# Patient Record
(demographics unavailable — no encounter records)

---

## 2025-01-02 NOTE — DISCUSSION/SUMMARY
[de-identified] : A discussion regarding available pain management treatment options occurred with the patient. These included interventional, rehabilitative, pharmacological, and alternative modalities. We will proceed with the following:   Imagin. MRI of the left knee ordered to rule out any internal derangement.  Interventional treatment options: - Potential treatment options such as further conservative therapy, injections, and surgery were briefly discussed.   Rehabilitative options: -Participation in active HEP was discussed and printed. KNEES: Patient given specific exercises to do including but not limited to side stepping, knee strengthener, quad strengthener, quad stretch, walking.   Medication based treatment options: - ordered naproxen 500 mg twice daily for duration of 4 weeks.   Complementary treatment options: - Patient was advised to stay away from any heavy lifting.  - Initiate physician directed activity and lifestyle modifications.  Follow up after imaging studies for further recommendations.  I, Snow Hurtado, attest that this documentation has been prepared under the direction and in the presence of Provider Mao Downey, DO The documentation recorded by the scribe, in my presence, accurately reflects the service I personally performed, and the decisions made by me with my edits as appropriate.  Best Regards, Mao Downey D.O.

## 2025-01-02 NOTE — PHYSICAL EXAM
[de-identified] : Left Knee exam: Mild effusion, no erythema, medial joint line tenderness to palpation, mildly antalgic gait. ttp to patella tendon
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
## 2025-02-13 NOTE — PHYSICAL EXAM
[de-identified] : Left Knee exam: Mild effusion, no erythema, medial joint line tenderness to palpation, mildly antalgic gait. TTP to patella tendon Right knee exam: Mild effusion, medial joint line tenderness to palpation, mildly antalgic gait. TTP to the left quadricep tendon.

## 2025-02-13 NOTE — DISCUSSION/SUMMARY
[de-identified] : A discussion regarding available pain management treatment options occurred with the patient. These included interventional, rehabilitative, pharmacological, and alternative modalities. We will proceed with the following:   Imaging: @[facility] 1. MRI of the left knee ordered to rule out any internal derangement. 2. X-ray of the right knee ordered due to pain and decrease in range of motion in that area to delineate a pain generator.  Interventional treatment options: - Potential treatment options such as further conservative therapy, injections, and surgery were briefly discussed.   Rehabilitative options: -Participation in active HEP was discussed and printed. KNEES: Patient given specific exercises to do including but not limited to side stepping, knee strengthener, quad strengthener, quad stretch, walking.   Medication based treatment options: -None indicated at this time.   Complementary treatment options: - Patient was advised to stay away from any heavy lifting.  - Physician directed activity and lifestyle modifications.  Follow up in 4-6 weeks for reassessment.  I, Snow Hurtado, attest that this documentation has been prepared under the direction and in the presence of Provider Mao Downey, DO The documentation recorded by the scribe, in my presence, accurately reflects the service I personally performed, and the decisions made by me with my edits as appropriate.  Best Regards, Mao Downey D.O.

## 2025-02-13 NOTE — PHYSICAL EXAM
[de-identified] : Left Knee exam: Mild effusion, no erythema, medial joint line tenderness to palpation, mildly antalgic gait. TTP to patella tendon Right knee exam: Mild effusion, medial joint line tenderness to palpation, mildly antalgic gait. TTP to the left quadricep tendon.
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
## 2025-02-13 NOTE — DISCUSSION/SUMMARY
[de-identified] : A discussion regarding available pain management treatment options occurred with the patient. These included interventional, rehabilitative, pharmacological, and alternative modalities. We will proceed with the following:   Imaging: @[facility] 1. MRI of the left knee ordered to rule out any internal derangement. 2. X-ray of the right knee ordered due to pain and decrease in range of motion in that area to delineate a pain generator.  Interventional treatment options: - Potential treatment options such as further conservative therapy, injections, and surgery were briefly discussed.   Rehabilitative options: -Participation in active HEP was discussed and printed. KNEES: Patient given specific exercises to do including but not limited to side stepping, knee strengthener, quad strengthener, quad stretch, walking.   Medication based treatment options: -None indicated at this time.   Complementary treatment options: - Patient was advised to stay away from any heavy lifting.  - Physician directed activity and lifestyle modifications.  Follow up in 4-6 weeks for reassessment.  I, Snow Hurtado, attest that this documentation has been prepared under the direction and in the presence of Provider Mao Downey, DO The documentation recorded by the scribe, in my presence, accurately reflects the service I personally performed, and the decisions made by me with my edits as appropriate.  Best Regards, Mao Downey D.O.

## 2025-03-03 NOTE — DISCUSSION/SUMMARY
[de-identified] : A discussion regarding available pain management treatment options occurred with the patient. These included interventional, rehabilitative, pharmacological, and alternative modalities. We will proceed with the following:   Imaging: @[facility] 1. MRI of the left knee ordered to rule out any internal derangement. 2. X-ray of the right knee showed no oa  Interventional treatment options: a right knee steroid injection was performed.  Risks with the procedure such as bleeding and infection was discussed in detail.   Rehabilitative options: -Participation in active HEP was discussed and printed. KNEES: Patient given specific exercises to do including but not limited to side stepping, knee strengthener, quad strengthener, quad stretch, walking.   Medication based treatment options: -None indicated at this time.   Complementary treatment options: - Patient was advised to stay away from any heavy lifting.  - Physician directed activity and lifestyle modifications.  Follow up in 4-6 weeks for reassessment.    Best Regards, Mao Downey D.O.

## 2025-03-03 NOTE — PROCEDURE
[Large Joint Injection] : Large joint injection [Right] : of the right [Knee] : knee [Pain] : pain [Alcohol] : alcohol [___ cc    0.25%] : Bupivacaine (Marcaine) ~Vcc of 0.25%  [___ cc    4mg] : Dexamethasone (Decadron) ~Vcc of 4 mg

## 2025-03-03 NOTE — HISTORY OF PRESENT ILLNESS
[FreeTextEntry1] :    02-: Patient presents to the office today for a follow up visit with continued left knee pain. He has been participating in a home exercise program focusing on side stepping, knee strengthener, quad strengthener, quad stretch, walking 3-4 times a week for 6 weeks with no significant relief.  He notes he was trialing naproxen however discontinued it secondary to stomach problems.  He also notes since he has been overcompensating with his right knee, he is now experiencing right knee pain that is also dull, achy knee pain worse with standing and walking, better with sitting without associated numbness, tingling or radicular pain. Pain is 8/10 in intensity. He has tried NSAIDs, PT/HEP without significant relief. Patient denies locking, clicking, instability, falls.  3/3/2025 Patient presents with right knee pain worse with standing better with sitting. Pain is dull, achy in nature. Pain is 8/10 and wants a cortisone shot and doesnt tolerate nsaids.

## 2025-04-11 NOTE — PHYSICAL EXAM
[de-identified] : Bilateral medial and lateral knee tenderness RT shoulder Sow + tinels + b/l proximal wrists

## 2025-04-11 NOTE — HISTORY OF PRESENT ILLNESS
[FreeTextEntry1] :   02-: Patient presents to the office today for a follow up visit with continued left knee pain. He has been participating in a home exercise program focusing on side stepping, knee strengthener, quad strengthener, quad stretch, walking 3-4 times a week for 6 weeks with no significant relief.  He notes he was trialing naproxen however discontinued it secondary to stomach problems.  He also notes since he has been overcompensating with his right knee, he is now experiencing right knee pain that is also dull, achy knee pain worse with standing and walking, better with sitting without associated numbness, tingling or radicular pain. Pain is 8/10 in intensity. He has tried NSAIDs, PT/HEP without significant relief. Patient denies locking, clicking, instability, falls.  3/3/2025 Patient presents with right knee pain worse when standing better with sitting. Pain is dull, achy in nature. Pain is 8/10 and wants a cortisone shot and doesn't tolerate nsaids.  PRESENTING TODAY 04-: He is under our care for bilateral knee pain which is worse when standing. Last seen on 03/24/2025 and since then there has been no new complaints or acute changes. The left knee continues to bother him.  We reviewed the MRI of his left knee in detail during today's visit which has been scanned into his chart.  He continues with complaints of a "swelling" feeling in his bilateral hands. This feeling is constant and makes it difficult for him to perform his work duties as an . I advised that this is likely related to carpal tunnel syndrome.   He also continues with right shoulder pain. He hears a popping in the shoulder with movement. Upon physical examination, there might be a labral tear.  We reviewed the x-ray of his right shoulder in detail during today's visit which has been scanned into his chart. He has been participating in an active home exercise program including but not limited to, active elbow flexion and extension, bicep stretch, bicep curl, single arm shoulder flexion, resisted shoulder internal rotation with side lying external rotation, resisted shoulder external rotation 3-4x a week for 2 weeks so far. Pain is a 8/10 in intensity. Patient denies any bowel or bladder dysfunction, incontinence, or saddle anesthesia.  He also complains of left sided low back pain. He does have a lipoma on the left side of his low back.  He describes his low back pain as dull, achy throbbing that does not radiate and is 7/10 pain intensity. Sitting and bending improves the pain, standing and walking worsens the pain. NSAIDs has been tried without relief. Patient denies bowel/bladder incontinence, weakness, falls.

## 2025-04-11 NOTE — PHYSICAL EXAM
[de-identified] : Bilateral medial and lateral knee tenderness RT shoulder Sow + tinels + b/l proximal wrists

## 2025-04-11 NOTE — DISCUSSION/SUMMARY
[de-identified] : A discussion regarding available pain management treatment options occurred with the patient. These included interventional, rehabilitative, pharmacological, and alternative modalities. We will proceed with the following:   Imaging: @[facility] 1.  MRI of the right knee ordered to rule out any internal derangement. 3. An Upper Extremity EMG/NCV was ordered to delineate radiculopathy vs neuropathies vs nerve damage.  Rehabilitative options: -Participation in active HEP was discussed and printed. KNEES: Patient given specific exercises to do including but not limited to side stepping, knee strengthener, quad strengthener, quad stretch, walking.   Medication based treatment options: -None indicated at this time.   Complementary treatment options: - Patient was advised to stay away from any heavy lifting.  - Physician directed activity and lifestyle modifications. -Provided HEP for the right shoulder  Follow up in 3 weeks for reassessment.  I, Christina Melchor, attest that this documentation has been prepared under the direction and in the presence of Provider Mao Downey, DO The documentation recorded by the scribe, in my presence, accurately reflects the service I personally performed, and the decisions made by me with my edits as appropriate.  Best Regards, Mao Downey D.O.

## 2025-04-11 NOTE — DISCUSSION/SUMMARY
[de-identified] : A discussion regarding available pain management treatment options occurred with the patient. These included interventional, rehabilitative, pharmacological, and alternative modalities. We will proceed with the following:   Imaging: @[facility] 1.  MRI of the right knee ordered to rule out any internal derangement. 3. An Upper Extremity EMG/NCV was ordered to delineate radiculopathy vs neuropathies vs nerve damage.  Rehabilitative options: -Participation in active HEP was discussed and printed. KNEES: Patient given specific exercises to do including but not limited to side stepping, knee strengthener, quad strengthener, quad stretch, walking.   Medication based treatment options: -None indicated at this time.   Complementary treatment options: - Patient was advised to stay away from any heavy lifting.  - Physician directed activity and lifestyle modifications. -Provided HEP for the right shoulder  Follow up in 3 weeks for reassessment.  I, Christina Melchor, attest that this documentation has been prepared under the direction and in the presence of Provider Mao Downey, DO The documentation recorded by the scribe, in my presence, accurately reflects the service I personally performed, and the decisions made by me with my edits as appropriate.  Best Regards, Mao Downey D.O.

## 2025-04-11 NOTE — REASON FOR VISIT
[Follow-Up Visit] : a follow-up pain management visit
[Follow-Up Visit] : a follow-up pain management visit
92

## 2025-05-16 NOTE — PROCEDURE
[Large Joint Injection] : Large joint injection [Left] : of the left [Knee] : knee [Pain] : pain [Alcohol] : alcohol [___ cc    0.25%] : Bupivacaine (Marcaine) ~Vcc of 0.25%  [___ cc    4mg] : Dexamethasone (Decadron) ~Vcc of 4 mg  [Call if redness, pain or fever occur] : call if redness, pain or fever occur [Apply ice for 15min out of every hour for the next 12-24 hours as tolerated] : apply ice for 15 minutes out of every hour for the next 12-24 hours as tolerated [Previous OTC use and PT nontherapeutic] : patient has tried OTC's including aspirin, Ibuprofen, Aleve, etc or prescription NSAIDS, and/or exercises at home and/or physical therapy without satisfactory response [Patient had decreased mobility in the joint] : patient had decreased mobility in the joint [Risks, benefits, alternatives discussed / Verbal consent obtained] : the risks benefits, and alternatives have been discussed, and verbal consent was obtained

## 2025-05-16 NOTE — HISTORY OF PRESENT ILLNESS
[FreeTextEntry1] :    PRESENTING TODAY 05-: He is under our care for bilateral knee pain which is worse when standing. Last seen on 04/11/2025 and since then there has been no new complaints or acute changes. The left knee continues to bother him and is 8/10. There is some swelling present. He is eager to proceed with a steroid injection.  He continues with complaints of a "swelling" feeling in his bilateral hands. Worse in the morning. This feeling is constant and makes it difficult for him to perform his work duties as an . I advised that this is likely related to carpal tunnel syndrome.

## 2025-05-16 NOTE — DISCUSSION/SUMMARY
[de-identified] : A discussion regarding available pain management treatment options occurred with the patient. These included interventional, rehabilitative, pharmacological, and alternative modalities. We will proceed with the following:  Interventional treatment:  1. Opted to perform a left knee steroid injection in office today. Risks with the procedure such as bleeding and infection was discussed in detail.  -The risks and benefits were discussed which included bleeding, infection, nerve injury, no pain relief or worse, increased pain. All questions were answered and the patient will schedule for the injection on the next available date.   Imaging: @[facility] 1. An Upper Extremity EMG/NCV was ordered to delineate radiculopathy vs neuropathies vs nerve damage.  Rehabilitative options: -Participation in active HEP was discussed and printed. KNEES: Patient given specific exercises to do including but not limited to side stepping, knee strengthener, quad strengthener, quad stretch, walking.   Medication based treatment options: -reordered meloxicam 7.5mg for 30 days   Complementary treatment options: - Patient was advised to stay away from any heavy lifting.  - Physician directed activity and lifestyle modifications. -Provided HEP for the right shoulder  Follow up in 3-4 weeks to review the EMG of the upper extremities.   I, Christina Melchor, attest that this documentation has been prepared under the direction and in the presence of Provider Mao Downey, DO The documentation recorded by the scribe, in my presence, accurately reflects the service I personally performed, and the decisions made by me with my edits as appropriate.  Best Regards, Mao Downey D.O.
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
## 2025-06-20 NOTE — DISCUSSION/SUMMARY
[de-identified] : A discussion regarding available pain management treatment options occurred with the patient. These included interventional, rehabilitative, pharmacological, and alternative modalities. We will proceed with the following:  Imagin. Ordered an X-ray of the cervical and thoracic spine due to pain and decrease in range of motion in that area to delineate a pain generator.   Rehabilitative options: 1. Participate in an active HEP, discussed and printed.   I gave the patient a list of home exercises to do for pain reduction and overall improvement in functional status including but not limited to active neck rotation, active neck side bend, neck flexion, neck extension, chin tuck, scalene stretch, isometric neck flexion, isometric neck extension, isometric neck sidebend, headlift/neck curl, headlift/neck sidebend, neck extension on hands and knees, and scapula squeeze.  Medications: 1. Ordered Methylprednisolone 21-tablet, 6-day taper pack.   I advised Mr. Bermudez that the steroid should be taken with food.  In addition, while taking the prescribed steroid, no over the counter or other NSAIDs should be used, such as ibuprofen (Motrin or Advil) or naproxen (Aleve) as this can cause stomach upset or other side effects.  If needed for fever or breakthrough pain Tylenol can be used.   Complementary treatment options: - lifestyle modifications discussed - avoid bending and bend with knees - avoid heavy lifting   - Follow up in 3-4 weeks.   * For his left wrist pain, I will have him set up with Orthopedics. I will also have him seen by Dr. Noland for his left shoulder.   I, Glenis Spence, attest that this documentation has been prepared under the direction and in the presence of Provider Mao Downey, DO   The documentation recorded by the scribe, in my presence, accurately reflects the service I personally performed, and the decisions made by me with my edits as appropriate.   Best Regards,  Mao Downey D.O.

## 2025-06-20 NOTE — DISCUSSION/SUMMARY
[de-identified] : A discussion regarding available pain management treatment options occurred with the patient. These included interventional, rehabilitative, pharmacological, and alternative modalities. We will proceed with the following:  Imagin. Ordered an X-ray of the cervical and thoracic spine due to pain and decrease in range of motion in that area to delineate a pain generator.   Rehabilitative options: 1. Participate in an active HEP, discussed and printed.   I gave the patient a list of home exercises to do for pain reduction and overall improvement in functional status including but not limited to active neck rotation, active neck side bend, neck flexion, neck extension, chin tuck, scalene stretch, isometric neck flexion, isometric neck extension, isometric neck sidebend, headlift/neck curl, headlift/neck sidebend, neck extension on hands and knees, and scapula squeeze.  Medications: 1. Ordered Methylprednisolone 21-tablet, 6-day taper pack.   I advised Mr. Bermudez that the steroid should be taken with food.  In addition, while taking the prescribed steroid, no over the counter or other NSAIDs should be used, such as ibuprofen (Motrin or Advil) or naproxen (Aleve) as this can cause stomach upset or other side effects.  If needed for fever or breakthrough pain Tylenol can be used.   Complementary treatment options: - lifestyle modifications discussed - avoid bending and bend with knees - avoid heavy lifting   - Follow up in 3-4 weeks.   * For his left wrist pain, I will have him set up with Orthopedics. I will also have him seen by Dr. Noland for his left shoulder.   I, Glenis Spence, attest that this documentation has been prepared under the direction and in the presence of Provider Mao Downey, DO   The documentation recorded by the scribe, in my presence, accurately reflects the service I personally performed, and the decisions made by me with my edits as appropriate.   Best Regards,  Mao Downey D.O.

## 2025-07-15 NOTE — ASSESSMENT
[FreeTextEntry1] : Patient has left-sided wrist pain discomfort.  It is in the area of the anatomic snuffbox.  Making me concerned about a possible occult scaphoid fracture as a result of his injury.  Therefore MRI is indicated he will contact me back after the MRI is performed the MRI is being done to evaluate whether or not there is a scaphoid fracture present.
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
## 2025-07-15 NOTE — DATA REVIEWED
[FreeTextEntry1] : Radiographs 3 views of the left wrist are reviewed from an outside facility showing no fracture dislocation no destructive lesion

## 2025-07-15 NOTE — PHYSICAL EXAM
[de-identified] : Patient is removed from his brace.  Good range of motion to the fingers.  He has tenderness to palpation in the anatomic snuffbox.  There is no instability at the MP joint to varus or valgus stress testing.  There is normal capillary refill.  No masses.  Mild tenderness along the metaphyseal wrist